# Patient Record
Sex: FEMALE | Race: WHITE | NOT HISPANIC OR LATINO
[De-identification: names, ages, dates, MRNs, and addresses within clinical notes are randomized per-mention and may not be internally consistent; named-entity substitution may affect disease eponyms.]

---

## 2017-05-16 PROBLEM — Z00.00 ENCOUNTER FOR PREVENTIVE HEALTH EXAMINATION: Status: ACTIVE | Noted: 2017-05-16

## 2017-05-31 ENCOUNTER — APPOINTMENT (OUTPATIENT)
Dept: SURGERY | Facility: CLINIC | Age: 42
End: 2017-05-31

## 2017-10-16 ENCOUNTER — EMERGENCY (EMERGENCY)
Facility: HOSPITAL | Age: 42
LOS: 0 days | Discharge: HOME | End: 2017-10-16
Admitting: INTERNAL MEDICINE
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
## 2017-10-16 DIAGNOSIS — M54.2 CERVICALGIA: ICD-10-CM

## 2017-10-16 DIAGNOSIS — Z88.8 ALLERGY STATUS TO OTHER DRUGS, MEDICAMENTS AND BIOLOGICAL SUBSTANCES: ICD-10-CM

## 2017-10-16 DIAGNOSIS — S16.1XXA STRAIN OF MUSCLE, FASCIA AND TENDON AT NECK LEVEL, INITIAL ENCOUNTER: ICD-10-CM

## 2017-10-16 DIAGNOSIS — S39.012A STRAIN OF MUSCLE, FASCIA AND TENDON OF LOWER BACK, INITIAL ENCOUNTER: ICD-10-CM

## 2018-02-26 ENCOUNTER — EMERGENCY (EMERGENCY)
Facility: HOSPITAL | Age: 43
LOS: 0 days | Discharge: HOME | End: 2018-02-26
Admitting: INTERNAL MEDICINE

## 2018-02-26 VITALS
TEMPERATURE: 98 F | SYSTOLIC BLOOD PRESSURE: 127 MMHG | DIASTOLIC BLOOD PRESSURE: 83 MMHG | HEART RATE: 66 BPM | OXYGEN SATURATION: 100 % | RESPIRATION RATE: 18 BRPM

## 2018-02-26 DIAGNOSIS — H92.02 OTALGIA, LEFT EAR: ICD-10-CM

## 2018-02-26 DIAGNOSIS — H61.23 IMPACTED CERUMEN, BILATERAL: ICD-10-CM

## 2018-02-26 NOTE — ED PROVIDER NOTE - MEDICAL DECISION MAKING DETAILS
pt counseled; Debrox as directed; pt needs to return to ER or f/u with her PCP in 48 hours for cerumen removal; pt will schedule a dental follow up with her private dentist; will give ENT follow up as well; any new or worsening symptoms, pt will return to ER  Note complete

## 2018-02-26 NOTE — ED PROVIDER NOTE - ENMT, MLM
Airway patent, Nasal mucosa clear. Mouth with normal mucosa. Throat has no vesicles, no oropharyngeal exudates and uvula is midline, (+) bilateral impacted cerumen; no sinus tenderness

## 2018-02-26 NOTE — ED PROVIDER NOTE - OBJECTIVE STATEMENT
42 y.o. female with a PMHx of endometriosis presented to the ER c/o intermittent Left earache for the past month.  Pt has been on a full course of Augmentin and po steroids without relief.  (+) impacted cerumen.  Pt noncompliant with Debrox.  Pt has not seen a dentist in over 2 years- denies dental pain.  Pt denies fever, chills, headache, facial pain, sore throat.  No other complaints.

## 2019-01-20 ENCOUNTER — FORM ENCOUNTER (OUTPATIENT)
Age: 44
End: 2019-01-20

## 2020-01-21 ENCOUNTER — FORM ENCOUNTER (OUTPATIENT)
Age: 45
End: 2020-01-21

## 2021-03-10 PROBLEM — N80.9 ENDOMETRIOSIS, UNSPECIFIED: Chronic | Status: ACTIVE | Noted: 2018-02-26

## 2021-04-27 ENCOUNTER — APPOINTMENT (OUTPATIENT)
Dept: PULMONOLOGY | Facility: CLINIC | Age: 46
End: 2021-04-27
Payer: COMMERCIAL

## 2021-04-27 VITALS
SYSTOLIC BLOOD PRESSURE: 118 MMHG | HEIGHT: 61 IN | HEART RATE: 96 BPM | RESPIRATION RATE: 14 BRPM | DIASTOLIC BLOOD PRESSURE: 70 MMHG | OXYGEN SATURATION: 98 % | WEIGHT: 212 LBS | BODY MASS INDEX: 40.02 KG/M2

## 2021-04-27 DIAGNOSIS — Z86.39 PERSONAL HISTORY OF OTHER ENDOCRINE, NUTRITIONAL AND METABOLIC DISEASE: ICD-10-CM

## 2021-04-27 DIAGNOSIS — Z86.59 PERSONAL HISTORY OF OTHER MENTAL AND BEHAVIORAL DISORDERS: ICD-10-CM

## 2021-04-27 PROCEDURE — 99203 OFFICE O/P NEW LOW 30 MIN: CPT | Mod: 25

## 2021-04-27 PROCEDURE — 99072 ADDL SUPL MATRL&STAF TM PHE: CPT

## 2021-04-27 PROCEDURE — 71046 X-RAY EXAM CHEST 2 VIEWS: CPT

## 2021-04-27 NOTE — PHYSICAL EXAM
[No Acute Distress] : no acute distress [Normal Oropharynx] : normal oropharynx [II] : Mallampati Class: II [Normal Appearance] : normal appearance [No Neck Mass] : no neck mass [Normal Rate/Rhythm] : normal rate/rhythm [Normal S1, S2] : normal s1, s2 [No Murmurs] : no murmurs [No Resp Distress] : no resp distress [Clear to Auscultation Bilaterally] : clear to auscultation bilaterally [No Abnormalities] : no abnormalities [Normal Gait] : normal gait [No Clubbing] : no clubbing [No Cyanosis] : no cyanosis [No Edema] : no edema [FROM] : FROM [No Focal Deficits] : no focal deficits [Oriented x3] : oriented x3 [Normal Affect] : normal affect

## 2021-04-27 NOTE — HISTORY OF PRESENT ILLNESS
[Snoring] : snoring [Unrefreshing Sleep] : unrefreshing sleep [Sleepy When Sedentary] : sleepy when sedentary [Currently Experiencing] : The patient is currently experiencing symptoms. [None] : The patient is not currently being treated for this problem [Obesity] : obesity [Hypertension] : hypertension

## 2021-05-10 ENCOUNTER — LABORATORY RESULT (OUTPATIENT)
Age: 46
End: 2021-05-10

## 2021-05-10 ENCOUNTER — OUTPATIENT (OUTPATIENT)
Dept: OUTPATIENT SERVICES | Facility: HOSPITAL | Age: 46
LOS: 1 days | Discharge: HOME | End: 2021-05-10

## 2021-05-11 DIAGNOSIS — N80.0 ENDOMETRIOSIS OF UTERUS: ICD-10-CM

## 2021-05-11 DIAGNOSIS — Z87.42 PERSONAL HISTORY OF OTHER DISEASES OF THE FEMALE GENITAL TRACT: ICD-10-CM

## 2021-05-11 DIAGNOSIS — E61.8 DEFICIENCY OF OTHER SPECIFIED NUTRIENT ELEMENTS: ICD-10-CM

## 2021-05-11 DIAGNOSIS — Z11.59 ENCOUNTER FOR SCREENING FOR OTHER VIRAL DISEASES: ICD-10-CM

## 2021-05-11 DIAGNOSIS — N80.3 ENDOMETRIOSIS OF PELVIC PERITONEUM: ICD-10-CM

## 2021-05-11 DIAGNOSIS — Z83.42 FAMILY HISTORY OF FAMILIAL HYPERCHOLESTEROLEMIA: ICD-10-CM

## 2021-05-11 DIAGNOSIS — N76.0 ACUTE VAGINITIS: ICD-10-CM

## 2021-05-11 DIAGNOSIS — Z78.9 OTHER SPECIFIED HEALTH STATUS: ICD-10-CM

## 2021-05-11 DIAGNOSIS — Z82.49 FAMILY HISTORY OF ISCHEMIC HEART DISEASE AND OTHER DISEASES OF THE CIRCULATORY SYSTEM: ICD-10-CM

## 2021-05-11 DIAGNOSIS — Z80.3 FAMILY HISTORY OF MALIGNANT NEOPLASM OF BREAST: ICD-10-CM

## 2021-05-11 DIAGNOSIS — Z87.2 PERSONAL HISTORY OF DISEASES OF THE SKIN AND SUBCUTANEOUS TISSUE: ICD-10-CM

## 2021-05-11 DIAGNOSIS — N94.3 PREMENSTRUAL TENSION SYNDROME: ICD-10-CM

## 2021-05-11 LAB — CYTOLOGY CVX/VAG DOC THIN PREP: NEGATIVE

## 2021-05-12 ENCOUNTER — APPOINTMENT (OUTPATIENT)
Dept: OBGYN | Facility: CLINIC | Age: 46
End: 2021-05-12
Payer: COMMERCIAL

## 2021-05-12 VITALS
SYSTOLIC BLOOD PRESSURE: 120 MMHG | TEMPERATURE: 97.9 F | DIASTOLIC BLOOD PRESSURE: 78 MMHG | WEIGHT: 206 LBS | BODY MASS INDEX: 38.89 KG/M2 | HEIGHT: 61 IN

## 2021-05-12 PROCEDURE — 76830 TRANSVAGINAL US NON-OB: CPT

## 2021-05-12 PROCEDURE — 99072 ADDL SUPL MATRL&STAF TM PHE: CPT

## 2021-05-12 PROCEDURE — 99396 PREV VISIT EST AGE 40-64: CPT | Mod: 25

## 2021-05-12 NOTE — PHYSICAL EXAM
[Examination Of The Breasts] : a normal appearance [No Masses] : no breast masses were palpable [Labia Majora] : normal [Labia Minora] : normal [Normal] : normal [Absent] : absent [Uterine Adnexae] : normal

## 2021-05-12 NOTE — PROCEDURE
[Transvaginal Ultrasound] : transvaginal ultrasound [Absent] : absent [FreeTextEntry3] : ovaries appear normal\par no intervention [FreeTextEntry7] : 2.6cc [FreeTextEntry8] : 1.6cc

## 2021-05-12 NOTE — HISTORY OF PRESENT ILLNESS
[Diffuse] : diffuse [FreeTextEntry1] : last manuel note:\par \par 1. Premenstrual dysphoric disorder -\par INCREASE FLUOXITENE TO 20MG BIB DURING THE WORST 2 WEEKS, AND THEN DECREASE DOWN TO QD DURING THOSE WEEKS THAT SHE FEELS BETTER\par \par \par F32.81: Premenstrual dysphoric disorder\par fluoxetine 20 mg capsule - Take 1 capsule(s) twice a day by oral route as directed for 90 days.     Qty: 180 capsule(s)     Refills: 2     Pharmacy: [facility])\par US, BREAST\par \par 2. Premenstrual tension syndrome\par N94.3: Premenstrual tension syndrome\par \par 3. Gynecologic examination\par Z01.419: Encounter for gynecological examination (general) (routine) without abnormal findings\par PAP, LB\par MAMMO, SCREENING, DIGITAL, BILATERAL\par \par 4. Endometriosis (clinical) -\par STABLE\par \par N80.9: Endometriosis, unspecified\par US, TRANSVAGINAL\par \par US, TRANSVAGINAL\par \par Review of us, transvaginal taken on 01/22/2020 at [facility] shows:\par Imaging Studies:\par Uterus: absent.\par Cul de sac: no fluid was demonstrated.\par Right Ovary: volume (cc): 0.3.\par Left Ovary: volume (cc): 1.1.\par \par 3 days /month, pain so bad that she cannot function\par only feels well 7-10 days per month due to endometriosis\par  [TextBox_7] : from endometrioisis

## 2021-05-13 ENCOUNTER — OUTPATIENT (OUTPATIENT)
Dept: OUTPATIENT SERVICES | Facility: HOSPITAL | Age: 46
LOS: 1 days | Discharge: HOME | End: 2021-05-13
Payer: COMMERCIAL

## 2021-05-13 PROCEDURE — 95810 POLYSOM 6/> YRS 4/> PARAM: CPT | Mod: 26

## 2021-05-14 DIAGNOSIS — G47.33 OBSTRUCTIVE SLEEP APNEA (ADULT) (PEDIATRIC): ICD-10-CM

## 2021-05-16 LAB
C TRACH RRNA SPEC QL NAA+PROBE: NOT DETECTED
HPV HIGH+LOW RISK DNA PNL CVX: NOT DETECTED
N GONORRHOEA RRNA SPEC QL NAA+PROBE: NOT DETECTED
SOURCE AMPLIFICATION: NORMAL

## 2021-05-28 ENCOUNTER — APPOINTMENT (OUTPATIENT)
Age: 46
End: 2021-05-28
Payer: COMMERCIAL

## 2021-05-28 ENCOUNTER — APPOINTMENT (OUTPATIENT)
Age: 46
End: 2021-05-28

## 2021-05-28 VITALS
RESPIRATION RATE: 14 BRPM | SYSTOLIC BLOOD PRESSURE: 120 MMHG | WEIGHT: 211 LBS | OXYGEN SATURATION: 98 % | HEIGHT: 61 IN | DIASTOLIC BLOOD PRESSURE: 70 MMHG | BODY MASS INDEX: 39.84 KG/M2 | HEART RATE: 75 BPM

## 2021-05-28 DIAGNOSIS — E66.9 OBESITY, UNSPECIFIED: ICD-10-CM

## 2021-05-28 DIAGNOSIS — G47.33 OBSTRUCTIVE SLEEP APNEA (ADULT) (PEDIATRIC): ICD-10-CM

## 2021-05-28 PROCEDURE — 94010 BREATHING CAPACITY TEST: CPT

## 2021-05-28 PROCEDURE — 99213 OFFICE O/P EST LOW 20 MIN: CPT | Mod: 25

## 2021-05-28 NOTE — PHYSICAL EXAM
[No Acute Distress] : no acute distress [II] : Mallampati Class: II [Normal Oropharynx] : normal oropharynx [Normal Appearance] : normal appearance [No Neck Mass] : no neck mass [Normal Rate/Rhythm] : normal rate/rhythm [Normal S1, S2] : normal s1, s2 [No Murmurs] : no murmurs [No Resp Distress] : no resp distress [Clear to Auscultation Bilaterally] : clear to auscultation bilaterally [No Abnormalities] : no abnormalities [Normal Gait] : normal gait [No Clubbing] : no clubbing [No Cyanosis] : no cyanosis [No Edema] : no edema [FROM] : FROM [No Focal Deficits] : no focal deficits [Oriented x3] : oriented x3 [Normal Affect] : normal affect

## 2021-05-29 LAB — CYTOLOGY CVX/VAG DOC THIN PREP: NORMAL

## 2021-06-01 ENCOUNTER — APPOINTMENT (OUTPATIENT)
Age: 46
End: 2021-06-01

## 2021-06-15 ENCOUNTER — APPOINTMENT (OUTPATIENT)
Age: 46
End: 2021-06-15

## 2021-08-31 ENCOUNTER — NON-APPOINTMENT (OUTPATIENT)
Age: 46
End: 2021-08-31

## 2021-09-01 ENCOUNTER — NON-APPOINTMENT (OUTPATIENT)
Age: 46
End: 2021-09-01

## 2021-10-14 ENCOUNTER — NON-APPOINTMENT (OUTPATIENT)
Age: 46
End: 2021-10-14

## 2022-03-04 ENCOUNTER — NON-APPOINTMENT (OUTPATIENT)
Age: 47
End: 2022-03-04

## 2022-06-02 ENCOUNTER — LABORATORY RESULT (OUTPATIENT)
Age: 47
End: 2022-06-02

## 2022-06-02 ENCOUNTER — NON-APPOINTMENT (OUTPATIENT)
Age: 47
End: 2022-06-02

## 2022-06-02 ENCOUNTER — APPOINTMENT (OUTPATIENT)
Dept: OBGYN | Facility: CLINIC | Age: 47
End: 2022-06-02
Payer: COMMERCIAL

## 2022-06-02 VITALS
DIASTOLIC BLOOD PRESSURE: 87 MMHG | SYSTOLIC BLOOD PRESSURE: 121 MMHG | TEMPERATURE: 98 F | HEART RATE: 76 BPM | HEIGHT: 61 IN | WEIGHT: 160 LBS | BODY MASS INDEX: 30.21 KG/M2

## 2022-06-02 DIAGNOSIS — F32.81 PREMENSTRUAL DYSPHORIC DISORDER: ICD-10-CM

## 2022-06-02 DIAGNOSIS — N91.2 AMENORRHEA, UNSPECIFIED: ICD-10-CM

## 2022-06-02 DIAGNOSIS — B37.9 CANDIDIASIS, UNSPECIFIED: ICD-10-CM

## 2022-06-02 PROCEDURE — 99396 PREV VISIT EST AGE 40-64: CPT | Mod: 25

## 2022-06-02 PROCEDURE — 76830 TRANSVAGINAL US NON-OB: CPT

## 2022-06-02 NOTE — PROCEDURE
[Transvaginal Ultrasound] : transvaginal ultrasound [Absent] : absent [FreeTextEntry7] : 6.7cc volume, 2.7cm [FreeTextEntry3] : cervix normal\par right cystic follicle\par no free fluid [FreeTextEntry8] : not visualized

## 2022-06-02 NOTE — PHYSICAL EXAM
[Chaperone Present] : A chaperone was present in the examining room during all aspects of the physical examination [Examination Of The Breasts] : a normal appearance [No Masses] : no breast masses were palpable [Labia Majora] : normal [Labia Minora] : normal [Normal] : normal [Absent] : absent [Uterine Adnexae] : normal

## 2022-06-02 NOTE — HISTORY OF PRESENT ILLNESS
[Irregular Menstrual Interval] : irregular menstrual interval [Hot Flashes] : hot flashes [FreeTextEntry1] : here for her annual visit\par -skipping mths...still has light bleeding despite hysterectomy, but hasn't had menses in 2 mths.\par -otherwise , is feeling well

## 2022-06-08 LAB — HPV HIGH+LOW RISK DNA PNL CVX: DETECTED

## 2022-06-10 ENCOUNTER — NON-APPOINTMENT (OUTPATIENT)
Age: 47
End: 2022-06-10

## 2022-06-12 LAB — CYTOLOGY CVX/VAG DOC THIN PREP: NORMAL

## 2022-08-09 ENCOUNTER — RX RENEWAL (OUTPATIENT)
Age: 47
End: 2022-08-09

## 2022-11-22 ENCOUNTER — OUTPATIENT (OUTPATIENT)
Dept: OUTPATIENT SERVICES | Facility: HOSPITAL | Age: 47
LOS: 1 days | Discharge: HOME | End: 2022-11-22

## 2022-11-22 DIAGNOSIS — R10.13 EPIGASTRIC PAIN: ICD-10-CM

## 2022-11-22 PROCEDURE — 76700 US EXAM ABDOM COMPLETE: CPT | Mod: 26

## 2022-12-14 ENCOUNTER — APPOINTMENT (OUTPATIENT)
Dept: OBGYN | Facility: CLINIC | Age: 47
End: 2022-12-14

## 2022-12-14 VITALS
DIASTOLIC BLOOD PRESSURE: 85 MMHG | HEIGHT: 61 IN | SYSTOLIC BLOOD PRESSURE: 126 MMHG | BODY MASS INDEX: 31.15 KG/M2 | HEART RATE: 71 BPM | WEIGHT: 165 LBS

## 2022-12-14 PROCEDURE — 99213 OFFICE O/P EST LOW 20 MIN: CPT

## 2022-12-14 NOTE — PHYSICAL EXAM
[Labia Majora] : normal [Labia Minora] : normal [Normal] : normal [Nabothian Cyst ___ cm] : [unfilled] ~Ucm nabothian cyst [Absent] : absent [Uterine Adnexae] : normal

## 2022-12-17 LAB
CYTOLOGY CVX/VAG DOC THIN PREP: NORMAL
HPV HIGH+LOW RISK DNA PNL CVX: NOT DETECTED

## 2023-02-16 ENCOUNTER — NON-APPOINTMENT (OUTPATIENT)
Age: 48
End: 2023-02-16

## 2023-06-21 ENCOUNTER — APPOINTMENT (OUTPATIENT)
Dept: OBGYN | Facility: CLINIC | Age: 48
End: 2023-06-21
Payer: COMMERCIAL

## 2023-06-21 VITALS
HEART RATE: 76 BPM | DIASTOLIC BLOOD PRESSURE: 80 MMHG | SYSTOLIC BLOOD PRESSURE: 119 MMHG | HEIGHT: 61 IN | WEIGHT: 175 LBS | BODY MASS INDEX: 33.04 KG/M2

## 2023-06-21 DIAGNOSIS — N95.1 MENOPAUSAL AND FEMALE CLIMACTERIC STATES: ICD-10-CM

## 2023-06-21 PROCEDURE — 99396 PREV VISIT EST AGE 40-64: CPT

## 2023-06-21 RX ORDER — CYANOCOBALAMIN 1000 UG/ML
1000 INJECTION INTRAMUSCULAR; SUBCUTANEOUS
Refills: 0 | Status: COMPLETED | COMMUNITY
End: 2023-06-21

## 2023-06-21 RX ORDER — METFORMIN ER 750 MG 750 MG/1
750 TABLET ORAL
Qty: 30 | Refills: 0 | Status: COMPLETED | COMMUNITY
Start: 2021-04-20 | End: 2023-06-21

## 2023-06-21 RX ORDER — FLUOXETINE HYDROCHLORIDE 20 MG/1
20 TABLET ORAL TWICE DAILY
Qty: 180 | Refills: 3 | Status: ACTIVE | COMMUNITY
Start: 2021-05-12

## 2023-06-21 RX ORDER — CLOTRIMAZOLE AND BETAMETHASONE DIPROPIONATE 10; .5 MG/G; MG/G
1-0.05 CREAM TOPICAL TWICE DAILY
Qty: 1 | Refills: 3 | Status: COMPLETED | COMMUNITY
Start: 2022-06-02 | End: 2023-06-21

## 2023-06-21 RX ORDER — ATORVASTATIN CALCIUM 20 MG/1
20 TABLET, FILM COATED ORAL
Qty: 30 | Refills: 0 | Status: COMPLETED | COMMUNITY
Start: 2021-04-20 | End: 2023-06-21

## 2023-06-21 RX ORDER — FLUOXETINE HYDROCHLORIDE 20 MG/1
20 TABLET ORAL
Qty: 180 | Refills: 1 | Status: COMPLETED | COMMUNITY
Start: 2022-03-04 | End: 2023-06-21

## 2023-06-21 RX ORDER — FLUOXETINE HYDROCHLORIDE 20 MG/1
20 CAPSULE ORAL
Qty: 90 | Refills: 3 | Status: COMPLETED | COMMUNITY
Start: 2023-02-16 | End: 2023-06-21

## 2023-06-21 NOTE — PHYSICAL EXAM
[Examination Of The Breasts] : a normal appearance [No Masses] : no breast masses were palpable [Labia Majora] : normal [Labia Minora] : normal [Normal] : normal [Nabothian Cyst ___ cm] : [unfilled] ~Ucm nabothian cyst [Absent] : absent [Uterine Adnexae] : normal

## 2023-06-25 LAB
C TRACH RRNA SPEC QL NAA+PROBE: NOT DETECTED
CYTOLOGY CVX/VAG DOC THIN PREP: ABNORMAL
HPV HIGH+LOW RISK DNA PNL CVX: NOT DETECTED
N GONORRHOEA RRNA SPEC QL NAA+PROBE: NOT DETECTED
SOURCE AMPLIFICATION: NORMAL

## 2024-01-14 ENCOUNTER — RX RENEWAL (OUTPATIENT)
Age: 49
End: 2024-01-14

## 2024-03-12 ENCOUNTER — NON-APPOINTMENT (OUTPATIENT)
Age: 49
End: 2024-03-12

## 2024-03-12 DIAGNOSIS — Z12.39 ENCOUNTER FOR OTHER SCREENING FOR MALIGNANT NEOPLASM OF BREAST: ICD-10-CM

## 2024-04-06 ENCOUNTER — RX RENEWAL (OUTPATIENT)
Age: 49
End: 2024-04-06

## 2024-06-19 ENCOUNTER — NON-APPOINTMENT (OUTPATIENT)
Age: 49
End: 2024-06-19

## 2024-06-19 DIAGNOSIS — N64.89 OTHER SPECIFIED DISORDERS OF BREAST: ICD-10-CM

## 2024-06-26 ENCOUNTER — APPOINTMENT (OUTPATIENT)
Dept: OBGYN | Facility: CLINIC | Age: 49
End: 2024-06-26
Payer: COMMERCIAL

## 2024-06-26 VITALS
WEIGHT: 170 LBS | HEIGHT: 61 IN | DIASTOLIC BLOOD PRESSURE: 77 MMHG | HEART RATE: 71 BPM | SYSTOLIC BLOOD PRESSURE: 107 MMHG | BODY MASS INDEX: 32.1 KG/M2

## 2024-06-26 DIAGNOSIS — B97.7 PAPILLOMAVIRUS AS THE CAUSE OF DISEASES CLASSIFIED ELSEWHERE: ICD-10-CM

## 2024-06-26 DIAGNOSIS — Z01.419 ENCOUNTER FOR GYNECOLOGICAL EXAMINATION (GENERAL) (ROUTINE) W/OUT ABNORMAL FINDINGS: ICD-10-CM

## 2024-06-26 DIAGNOSIS — N80.9 ENDOMETRIOSIS, UNSPECIFIED: ICD-10-CM

## 2024-06-26 DIAGNOSIS — N92.6 IRREGULAR MENSTRUATION, UNSPECIFIED: ICD-10-CM

## 2024-06-26 LAB
BILIRUB UR QL STRIP: NORMAL
CLARITY UR: CLEAR
COLLECTION METHOD: NORMAL
GLUCOSE UR-MCNC: NORMAL
HCG UR QL: 0.2 EU/DL
HGB UR QL STRIP.AUTO: NORMAL
KETONES UR-MCNC: NORMAL
LEUKOCYTE ESTERASE UR QL STRIP: NORMAL
NITRITE UR QL STRIP: NORMAL
PH UR STRIP: 5.5
PROT UR STRIP-MCNC: NORMAL
SP GR UR STRIP: 1.01

## 2024-06-26 PROCEDURE — 81003 URINALYSIS AUTO W/O SCOPE: CPT | Mod: QW

## 2024-06-26 PROCEDURE — 76830 TRANSVAGINAL US NON-OB: CPT

## 2024-06-26 PROCEDURE — 99459 PELVIC EXAMINATION: CPT

## 2024-06-26 PROCEDURE — 99396 PREV VISIT EST AGE 40-64: CPT | Mod: 25

## 2024-06-27 ENCOUNTER — RX RENEWAL (OUTPATIENT)
Age: 49
End: 2024-06-27

## 2024-06-29 LAB — HPV HIGH+LOW RISK DNA PNL CVX: NOT DETECTED

## 2024-07-01 LAB — CYTOLOGY CVX/VAG DOC THIN PREP: NORMAL

## 2024-07-21 ENCOUNTER — NON-APPOINTMENT (OUTPATIENT)
Age: 49
End: 2024-07-21

## 2024-08-15 ENCOUNTER — APPOINTMENT (OUTPATIENT)
Dept: BREAST CENTER | Facility: CLINIC | Age: 49
End: 2024-08-15

## 2024-09-16 ENCOUNTER — RX RENEWAL (OUTPATIENT)
Age: 49
End: 2024-09-16

## 2025-07-02 ENCOUNTER — APPOINTMENT (OUTPATIENT)
Dept: OBGYN | Facility: CLINIC | Age: 50
End: 2025-07-02
Payer: COMMERCIAL

## 2025-07-02 VITALS
BODY MASS INDEX: 32.1 KG/M2 | WEIGHT: 170 LBS | HEIGHT: 61 IN | HEART RATE: 79 BPM | SYSTOLIC BLOOD PRESSURE: 117 MMHG | DIASTOLIC BLOOD PRESSURE: 83 MMHG

## 2025-07-02 LAB
BILIRUB UR QL STRIP: NEGATIVE
CLARITY UR: CLEAR
COLLECTION METHOD: NORMAL
GLUCOSE UR-MCNC: NEGATIVE
HCG UR QL: 0.2 EU/DL
HGB UR QL STRIP.AUTO: NEGATIVE
KETONES UR-MCNC: NEGATIVE
LEUKOCYTE ESTERASE UR QL STRIP: NEGATIVE
NITRITE UR QL STRIP: NEGATIVE
PH UR STRIP: 7
PROT UR STRIP-MCNC: NEGATIVE
SP GR UR STRIP: 1.01

## 2025-07-02 PROCEDURE — 99459 PELVIC EXAMINATION: CPT | Mod: NC

## 2025-07-02 PROCEDURE — 99386 PREV VISIT NEW AGE 40-64: CPT | Mod: 25

## 2025-07-02 PROCEDURE — 81003 URINALYSIS AUTO W/O SCOPE: CPT | Mod: QW

## 2025-07-09 LAB — HPV HIGH+LOW RISK DNA PNL CVX: NOT DETECTED
